# Patient Record
Sex: MALE | Race: WHITE | NOT HISPANIC OR LATINO | Employment: UNEMPLOYED | URBAN - METROPOLITAN AREA
[De-identification: names, ages, dates, MRNs, and addresses within clinical notes are randomized per-mention and may not be internally consistent; named-entity substitution may affect disease eponyms.]

---

## 2019-11-06 ENCOUNTER — ANCILLARY PROCEDURE (OUTPATIENT)
Dept: GENERAL RADIOLOGY | Facility: CLINIC | Age: 39
End: 2019-11-06
Attending: FAMILY MEDICINE
Payer: OTHER MISCELLANEOUS

## 2019-11-06 ENCOUNTER — TELEPHONE (OUTPATIENT)
Dept: FAMILY MEDICINE | Facility: CLINIC | Age: 39
End: 2019-11-06

## 2019-11-06 ENCOUNTER — OFFICE VISIT (OUTPATIENT)
Dept: FAMILY MEDICINE | Facility: CLINIC | Age: 39
End: 2019-11-06
Payer: OTHER MISCELLANEOUS

## 2019-11-06 VITALS
DIASTOLIC BLOOD PRESSURE: 89 MMHG | OXYGEN SATURATION: 98 % | SYSTOLIC BLOOD PRESSURE: 150 MMHG | TEMPERATURE: 98.1 F | HEIGHT: 72 IN | WEIGHT: 170 LBS | BODY MASS INDEX: 23.03 KG/M2 | HEART RATE: 114 BPM

## 2019-11-06 DIAGNOSIS — S22.42XG: ICD-10-CM
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
## 2019-11-06 DIAGNOSIS — M79.10 MYALGIA: ICD-10-CM

## 2019-11-06 DIAGNOSIS — R07.81 RIB PAIN ON LEFT SIDE: Primary | ICD-10-CM

## 2019-11-06 PROCEDURE — 71101 X-RAY EXAM UNILAT RIBS/CHEST: CPT | Mod: LT

## 2019-11-06 PROCEDURE — 99203 OFFICE O/P NEW LOW 30 MIN: CPT | Performed by: FAMILY MEDICINE

## 2019-11-06 RX ORDER — IBUPROFEN 600 MG/1
600 TABLET, FILM COATED ORAL
COMMUNITY
Start: 2019-10-16 | End: 2021-05-25

## 2019-11-06 RX ORDER — CYCLOBENZAPRINE HCL 5 MG
TABLET ORAL
Qty: 60 TABLET | Refills: 0 | Status: SHIPPED | OUTPATIENT
Start: 2019-11-06 | End: 2021-05-25

## 2019-11-06 RX ORDER — OXYCODONE AND ACETAMINOPHEN 5; 325 MG/1; MG/1
1-2 TABLET ORAL
COMMUNITY
Start: 2019-10-16 | End: 2021-05-25

## 2019-11-06 ASSESSMENT — MIFFLIN-ST. JEOR: SCORE: 1739.11

## 2019-11-06 ASSESSMENT — PAIN SCALES - GENERAL: PAINLEVEL: SEVERE PAIN (7)

## 2019-11-06 NOTE — LETTER
[facility]  [address] [facility] [address]  Phone: [phone]    November 6, 2019        Aleksandr Avina  [address] [facility] [address]          To whom it may concern:    RE: Aleksandr Avina    Patient was seen and treated today at our clinic.  Recommend going back to work with restrictions until re-evaluated.  Recommend re-evaluation in 1 week.    Restrictions: Sedentary work only.   No lifting no pushing no pulling no twisting of the trunk no kneeling no climbing    Please contact me for questions or concerns.      Sincerely,        Itzel Lenz MD

## 2019-11-06 NOTE — PATIENT INSTRUCTIONS
Recommend follow up in a week for re-evaluation  Recommend going to an Occupational Health Clinic or Workman's Comp Clinic   - [facility]       Patient Education     Rib Fracture (Broken Rib)     A chest x-ray may be done.   Your ribs are curved bones in your chest. They help protect your lungs and expand and contract when you breathe. Children's ribs bend easily and can often withstand a blow or fall. But adult ribs are more likely to break (fracture) under stress. Even coughing or a hard sneeze can fracture a rib.  When to go to the Emergency Room (ER)  Although they can be painful, most rib fractures aren't serious. But they often make it hard to cough or breathe deeply. Get medical care right away if you have:    Trouble breathing.    Nausea, vomiting, or stomach pain with a sore or bruised rib.    Pain that worsens over time.    An injury to the chest or stomach.  What to expect in the ER  Here is what will happen in the ER:     A healthcare provider will ask about your injury and examine you carefully.    An X-ray of your chest will likely be taken to show any major damage to ribs and lungs. But ribs can have small breaks that don't show up on X-rays, even though they still hurt.    You may be given medicine to ease your discomfort.    In rare cases, rib fractures can cause a lung to collapse or lead to bleeding in the chest. In these cases, a tube will be inserted into the chest to reinflate the lung or drain the blood.  Follow-up  You are likely to heal in 6 to 8 weeks. Most rib fractures heal on their own with no lasting effects. Call your healthcare provider right away if you notice any of these symptoms:    Increased chest pain    Shortness of breath    Fever or chills    Coughing up blood  Date Last Reviewed: 4/1/2018 2000-2018 [employer]. [address] [facility], [place] [address]. All rights reserved. This information is not intended as a substitute for professional medical  care. Always follow your healthcare professional's instructions.

## 2019-11-06 NOTE — PROGRESS NOTES
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   The  hit a boulder lost  and lost control  Patient fell forward and the  hit him from the back and patient was pinned to the swing set   Didn't come in right away but then eventually went in to the ER after a few days   head injury:No  loss of consciousness:  No  syncope or presyncope: No  chest pain or palpitation: No  mechanical fall:  Yes  using assistive devices:  No  blood thinners: No   denies headache  denies any nausea or vomiting  denies any amnesia, confusion or concussion symptoms  denies any blurring of vision  denies any otorrhea or rhinorhea  denies any neck pain  denies any back pain.  denies any chest pain or shortness of breath  denies any joint pain except noted above.  denies any bowel or bladder incontinence or motor or sensory deficits.  denies any abdominal pain, nausea or vomiting or flank pain  denies any hematuria    Patient was put on restrictions after the ER   Needed a follow up which is why he is here  No Known Allergies    History reviewed. No pertinent past medical history.    ibuprofen (ADVIL/MOTRIN) 600 MG tablet, Take 600 mg by mouth  oxyCODONE-acetaminophen (PERCOCET) 5-325 MG tablet, Take 1-2 tablets by mouth    No current facility-administered medications on file prior to visit.     Social history reviewed in Epic     ROS:   review of systems negative except for noted above.   No thoughts of harming self or others.     OBJECTIVE:  BP (!) 150/89   Pulse 114   Temp 98.1  F (36.7  C) (Oral)   Ht 1.829 m (6')   Wt 77.1 kg (170 lb)   SpO2 98%   BMI 23.06 kg/m     General:   awake, alert, and cooperative.  NAD.   Head: Normocephalic, atraumatic.  Eyes: Conjunctiva clear,   ENT: no periorbital ecchymosis, no otorrhea or rhinorrhea, negative Gastelum's sign, no raccoon eyes, no  hematympanum  Heart: Regular rate and rhythm. No murmur.  Lungs: Chest is clear; no wheezes or rales.   Abdomen: soft non-tender. No bruising noted.   Neuro: Alert and oriented - normal speech. Cranial nerves intact, MMT 5/5 all extremities, sensory intact, normal gait and normal cerebellar function  MS: Using extremities freely TENDER LEFT POSTERIOR RIBS , No cervical, thoracic, or lumbar spine tenderness  PSYCH:  Normal affect, normal speech  SKIN: no obvious rashes    ASSESSMENT:    ICD-10-CM    1. Rib pain on left side R07.81 XR Ribs & Chest Lt 3v     cyclobenzaprine (FLEXERIL) 5 MG tablet       PLAN:     Repeat chest xray done to follow up on rib fracture  [facility] records reviewed  CHEST XRAY report was pending at the time of discharge - later on showed 3 rib fractures instead of 1 initially seen on initial xray  Sometimes rib fractures do not show up immediately so this could be why  He does think pain is much better compared to initial injury   He denies any additional or recurrent fall in the interim  HE does think he is still in a lot of pain - although much better than initial  He also complains of some bilateral thigh achiness   See telephone note  Offered ER he declined - we will obtain additional CT chest abdomen and labs   Alarm signs or symptoms discussed, if present recommend go to ER   Pain control discussed - patient did not want any narcotic medication   Prescribed with flexeril  Warned sedating  Sedating medications given. Aware not to drive or operate machinery while on these medications. Caution with .   Do not take with alcohol or other sedating medications     Patient voiced undertanding.  Itzel Lenz M.D.       Itzel Lenz MD

## 2019-11-07 ENCOUNTER — NURSE TRIAGE (OUTPATIENT)
Dept: NURSING | Facility: CLINIC | Age: 39
End: 2019-11-07

## 2019-11-07 ENCOUNTER — TELEPHONE (OUTPATIENT)
Dept: FAMILY MEDICINE | Facility: CLINIC | Age: 39
End: 2019-11-07

## 2019-11-07 ENCOUNTER — ANCILLARY PROCEDURE (OUTPATIENT)
Dept: CT IMAGING | Facility: CLINIC | Age: 39
End: 2019-11-07
Attending: FAMILY MEDICINE
Payer: OTHER MISCELLANEOUS

## 2019-11-07 DIAGNOSIS — R07.81 RIB PAIN ON LEFT SIDE: ICD-10-CM

## 2019-11-07 DIAGNOSIS — M79.10 MYALGIA: ICD-10-CM

## 2019-11-07 DIAGNOSIS — S22.42XG: ICD-10-CM
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
## 2019-11-07 LAB
ALBUMIN SERPL-MCNC: 4.2 G/DL (ref 3.4–5)
ALP SERPL-CCNC: 87 U/L (ref 40–150)
ALT SERPL W P-5'-P-CCNC: 149 U/L (ref 0–70)
ANION GAP SERPL CALCULATED.3IONS-SCNC: 12 MMOL/L (ref 3–14)
AST SERPL W P-5'-P-CCNC: 127 U/L (ref 0–45)
BILIRUB SERPL-MCNC: 0.4 MG/DL (ref 0.2–1.3)
BUN SERPL-MCNC: 8 MG/DL (ref 7–30)
CALCIUM SERPL-MCNC: 8.6 MG/DL (ref 8.5–10.1)
CHLORIDE SERPL-SCNC: 95 MMOL/L (ref 94–109)
CK SERPL-CCNC: 163 U/L (ref 30–300)
CO2 SERPL-SCNC: 24 MMOL/L (ref 20–32)
CREAT SERPL-MCNC: 0.66 MG/DL (ref 0.66–1.25)
D DIMER PPP FEU-MCNC: 0.8 UG/ML FEU (ref 0–0.5)
ERYTHROCYTE [DISTWIDTH] IN BLOOD BY AUTOMATED COUNT: 12.3 % (ref 10–15)
GFR SERPL CREATININE-BSD FRML MDRD: >90 ML/MIN/{1.73_M2}
GLUCOSE SERPL-MCNC: 105 MG/DL (ref 70–99)
HCT VFR BLD AUTO: 44.9 % (ref 40–53)
HGB BLD-MCNC: 15.7 G/DL (ref 13.3–17.7)
MCH RBC QN AUTO: 35.1 PG (ref 26.5–33)
MCHC RBC AUTO-ENTMCNC: 35 G/DL (ref 31.5–36.5)
MCV RBC AUTO: 100 FL (ref 78–100)
PLATELET # BLD AUTO: 124 10E9/L (ref 150–450)
POTASSIUM SERPL-SCNC: 4.2 MMOL/L (ref 3.4–5.3)
PROT SERPL-MCNC: 7.7 G/DL (ref 6.8–8.8)
RBC # BLD AUTO: 4.47 10E12/L (ref 4.4–5.9)
SODIUM SERPL-SCNC: 131 MMOL/L (ref 133–144)
WBC # BLD AUTO: 6.1 10E9/L (ref 4–11)

## 2019-11-07 PROCEDURE — 71260 CT THORAX DX C+: CPT | Mod: TC

## 2019-11-07 PROCEDURE — 85379 FIBRIN DEGRADATION QUANT: CPT | Performed by: FAMILY MEDICINE

## 2019-11-07 PROCEDURE — 74160 CT ABDOMEN W/CONTRAST: CPT | Mod: TC

## 2019-11-07 PROCEDURE — 80053 COMPREHEN METABOLIC PANEL: CPT | Performed by: FAMILY MEDICINE

## 2019-11-07 PROCEDURE — 85027 COMPLETE CBC AUTOMATED: CPT | Performed by: FAMILY MEDICINE

## 2019-11-07 PROCEDURE — 82550 ASSAY OF CK (CPK): CPT | Performed by: FAMILY MEDICINE

## 2019-11-07 PROCEDURE — 36415 COLL VENOUS BLD VENIPUNCTURE: CPT | Performed by: FAMILY MEDICINE

## 2019-11-07 RX ORDER — IOPAMIDOL 755 MG/ML
83 INJECTION, SOLUTION INTRAVASCULAR ONCE
Status: COMPLETED | OUTPATIENT
Start: 2019-11-07 | End: 2019-11-07

## 2019-11-07 RX ADMIN — IOPAMIDOL 83 ML: 755 INJECTION, SOLUTION INTRAVASCULAR at 11:42

## 2019-11-07 NOTE — TELEPHONE ENCOUNTER
I called patient this evening with xray report  Given 3 fractures - concern of a more serious injury  Recommend CT chest and abdomen to further evaluate for any other traumatic injury  Also recommend labs    To TC - please call and schedule the followin.  CT chest and abdomen STAT read only recommend today  2. please have patient schedule a lab appointment to do today as well.    Thank you    Itzel Lenz M.D.

## 2019-11-07 NOTE — TELEPHONE ENCOUNTER
Called and set up CT at [facility]-arrival @ 11:15 AM today. Lab appointment also @ [facility] at 10:45 AM. Called and informed patient of this and nothing to eat or drink 2 hours before the CT.Brenda Sauer MA/JUMANA

## 2019-11-08 NOTE — TELEPHONE ENCOUNTER
Clinic Action Needed:No    Reason for Call: Aleksandr said he just received a call from Dr. Lenz telling him he has 6 broken ribs and that he should be further assessed in ER tonight.  Aleksandr was very skeptical of that report.    Advised Aleksandr that if he was instructed to go to ER for further evaluation of rib injuries I urge him to follow the advice of the provider.    Caller appears to understand directives and agrees with plan.     Routed to: Not routed.    Shiela Stubbs RN  [facility]

## 2019-11-08 NOTE — TELEPHONE ENCOUNTER
Patient was called with CT chest abdomen (trauma protocol) results and lab results  Discussed 6 rib fractures on CT but no other internal injuries  Discussed lab results - low sodium, elevated liver enzymes -   Patient has a history of alcoholism and admits to be drinking daily but he says not since this morning. Risks discussed. Risks of withdrawal seizures also discuussed. No thoughts of harming self or others. He feels safe. He states he is willing to start some counseling about this - recommend discussion with primary care provider on follow up  He will need to establish care and follow up with a primary care provider sometime next week.    However d-dimer was elevated - ordered because of bilateral thigh pain - no injury.  Increased risk for DVT - recommend ER for doppler US bilateral rule out DVT. Patient voiced understanding. Declined ambulance. Risks of transfer discussed.    Itzel Lenz M.D.

## 2019-11-09 ENCOUNTER — NURSE TRIAGE (OUTPATIENT)
Dept: NURSING | Facility: CLINIC | Age: 39
End: 2019-11-09

## 2019-11-09 NOTE — TELEPHONE ENCOUNTER
"Jason called asking about two prescriptions he thought were going to be prescribed for his pain from the broken ribs.  I read to him a portion of Dr Robbins's note:    \"Pain control discussed - patient did not want any narcotic medication   Prescribed with flexeril  Warned sedating  Sedating medications given. Aware not to drive or operate machinery while on these medications. Caution with .   Do not take with alcohol or other sedating medications\".    He stated then that he did remember that conversation.  I encouraged him to take the ibuprofen and always with food in his stomach.  I told him to take two tablets every six hours and that it will after a few days help with his pain because of its antinflammatory properties.    He stated understanding and agreement.    He said he is having trouble breathing though it's no worse than what it's been all along. I told him if his breathing worsens he needs to consider being seen again.  Again, he stated understanding and agreement of this.   Alma Rosa BRITO RN [facility]     "

## 2020-03-11 ENCOUNTER — TELEPHONE (OUTPATIENT)
Dept: FAMILY MEDICINE | Facility: CLINIC | Age: 40
End: 2020-03-11

## 2020-03-11 NOTE — TELEPHONE ENCOUNTER
I received note requesting a return to work.  In my outbox.  This was 4 months ago. Paperwork just received and in my mail today   Please scan and document into Epic.   Then give patient the letter.  Unfortunately I only have limited information there because he needed a follow up appointment  HE needs to contact the person he followed up with to sign additional paperwork if necessary.  Otherwise schedule an appointment with an occupational health clinic or primary care provider.  I do NOT advise an urgent care appointment for follow up  Thanks  Itzel Lenz M.D.

## 2020-03-18 NOTE — TELEPHONE ENCOUNTER
On 3/13/20 I faxed the completed and signed [facility] paperwork to Yasmine @[phone].  I mailed the return to work letter to the patient at his home address on file on [address] in [place].  Victorina Hurd,

## 2021-05-24 NOTE — PROGRESS NOTES
Assessment & Plan     Rash  40-year-old male with left neck and face coalescing macular rash, by history starts is vesicular, now with crusting.  Started after wrestling with a friend.  States friend does not have any other similar rashes, we discussed herpes gladiatorum.  Seems to be most pronounced in areas of hair, possibly folliculitis, will treat as such with cephalexin.  We discussed other differential diagnosis, to include contact dermatitis, he does not have allergies, nor does he remember coming in contact with anything.  He will follow-up if no improvement or any worsening in the next 48 hours.  Discussed antibiotic use, complete for full duration.  - cephALEXin (KEFLEX) 500 MG capsule; Take 1 capsule (500 mg) by mouth 2 times daily for 10 days    Tobacco Cessation:   reports that he has been smoking. He has been smoking about 0.50 packs per day. He has never used smokeless tobacco.      Return in about 4 weeks (around 6/22/2021) for Annual Well Check.    Donato Nieto MD  [facility] IN[facility]ID Brandon is a 40 year old who presents for the following health issues     HPI     Rash  Onset/Duration: 2 weeks  Description  Location: neck, chest , face  Character: blotchy, raised, draining, red  Itching: moderate  Intensity:  moderate  Progression of Symptoms:  worsening  Accompanying signs and symptoms:   Fever: no  Body aches or joint pain: no  Sore throat symptoms: no  Recent cold symptoms: no  History:           Previous episodes of similar rash: None  New exposures:  None  Recent travel: no  Exposure to similar rash: no  Precipitating or alleviating factors: none  Therapies tried and outcome: A&D ointment and rubbing alchohol, benadryl        Review of Systems   Constitutional, HEENT, cardiovascular, pulmonary, gi and gu systems are negative, except as otherwise noted.      Objective    /80 (BP Location: Left arm, Patient Position: Chair, Cuff Size: Adult Regular)    "Pulse 100   Temp 97.2  F (36.2  C) (Temporal)   Resp 17   Ht 1.765 m (5' 9.5\")   Wt 64.4 kg (142 lb)   SpO2 96%   BMI 20.67 kg/m    Body mass index is 20.67 kg/m .  Physical Exam   GENERAL: healthy, alert and no distress  NECK: no adenopathy, no asymmetry, masses, or scars and thyroid normal to palpation  RESP: lungs clear to auscultation - no rales, rhonchi or wheezes  CV: regular rate and rhythm, normal S1 S2, no S3 or S4, no murmur, click or rub, no peripheral edema and peripheral pulses strong  MS: no gross musculoskeletal defects noted, no edema  SKIN: Coalescing macular rash left lateral neck with some mild crusting, smaller macular rashes in beard and lateral upper arm.  NEURO: Normal strength and tone, mentation intact and speech normal  PSYCH: mentation appears normal, affect normal/bright            "

## 2021-05-25 ENCOUNTER — OFFICE VISIT (OUTPATIENT)
Dept: FAMILY MEDICINE | Facility: CLINIC | Age: 41
End: 2021-05-25
Payer: COMMERCIAL

## 2021-05-25 ENCOUNTER — TELEPHONE (OUTPATIENT)
Dept: FAMILY MEDICINE | Facility: CLINIC | Age: 41
End: 2021-05-25

## 2021-05-25 VITALS
BODY MASS INDEX: 20.33 KG/M2 | TEMPERATURE: 97.2 F | DIASTOLIC BLOOD PRESSURE: 80 MMHG | HEART RATE: 100 BPM | WEIGHT: 142 LBS | HEIGHT: 70 IN | SYSTOLIC BLOOD PRESSURE: 130 MMHG | RESPIRATION RATE: 17 BRPM | OXYGEN SATURATION: 96 %

## 2021-05-25 DIAGNOSIS — R21 RASH: Primary | ICD-10-CM

## 2021-05-25 PROCEDURE — 99214 OFFICE O/P EST MOD 30 MIN: CPT | Performed by: FAMILY MEDICINE

## 2021-05-25 RX ORDER — CEPHALEXIN 500 MG/1
500 CAPSULE ORAL 2 TIMES DAILY
Qty: 20 CAPSULE | Refills: 0 | Status: SHIPPED | OUTPATIENT
Start: 2021-05-25 | End: 2021-06-04

## 2021-05-25 ASSESSMENT — MIFFLIN-ST. JEOR: SCORE: 1562.42

## 2021-05-25 ASSESSMENT — PAIN SCALES - GENERAL: PAINLEVEL: MODERATE PAIN (5)

## 2021-05-25 NOTE — PATIENT INSTRUCTIONS
"  Patient Education     Contact Dermatitis  Contact dermatitis is a skin rash caused by something that touches the skin and makes it irritated and inflamed. Your skin may be red, swollen, dry, and may be cracked. Blisters may form and ooze. The rash will itch.   Contact dermatitis often forms on the face and neck, backs of hands, forearms, genitals, and lower legs. But it can affect any area.   People can get contact dermatitis from lots of sources. These include:    Plants such as poison ivy, oak, or sumac    Chemicals in hair dyes and rinses, soaps, solvents, waxes, fingernail polish, and deodorants     Jewelry or watchbands made of nickel or cobalt  Contact dermatitis is not passed from person to person.  Talk with your healthcare provider about what may have caused the rash. A type of allergy testing called \"patch testing\" may be used to discover what you are allergic to. You will need to stay away from the source of the rash in the future to prevent it from coming back.   Treatment is done to ease itching and prevent the rash from coming back. The rash should go away in a few days to a few weeks.   Home care  Your healthcare provider may prescribe medicine to ease swelling and itching. Follow all instructions when using these medicines.   General care    Stay away from anything that heats up your skin, such as hot showers or baths, or direct sunlight. This can make itching worse.    Apply cold compresses to soothe your sores to help ease your symptoms. Do this for 30 minutes 3 to 4 times a day. You can make a cold compress by soaking a cloth in cold water. Squeeze out excess water. You can add colloidal oatmeal to the water to help reduce itching. For severe itching in a small area, apply an ice pack wrapped in a thin towel. Do this for 20 minutes 3 to 4 times a day.    You can also try wet dressings. One way to do this is to wear a wet piece of clothing under a dry one. Wear a damp shirt under a dry shirt if " your upper body is affected. This can relieve itching and prevent you from scratching the affected area.    You can also help ease large areas of itching by taking a lukewarm bath with colloidal oatmeal added to the water.    Use hydrocortisone cream for redness and irritation, unless another medicine was prescribed. Calamine lotion can also relieve mild symptoms.    Use oral diphenhydramine to help reduce itching. You can buy this antihistamine at drugstores and grocery stores. It can make you sleepy, so use lower doses during the daytime. Don't use diphenhydramine if you have glaucoma or have trouble urinating because of an enlarged prostate.    If a plant causes your rash, make sure to wash your skin and the clothes you were wearing when you came into contact with the plant. This is to wash away the plant oils that gave you the rash and prevent more or worse symptoms. If you have a pet that's been outdoors, its fur may also have oil from the plant. Bathe your pet with soap or shampoo.    Stay away from the substance or object that causes your symptoms. If you can t stay away from it, wear gloves or some other type of protection    Follow-up care  Follow up with your healthcare provider, or as advised.  When to seek medical advice  Call your healthcare provider or seek medical attention right away if any of these occur:     Spreading of the rash to other parts of your body    Severe swelling of your face, eyelids, mouth, throat or tongue    Trouble urinating due to swelling in the genital area    Fever of 100.4 F (38 C) or higher, or as advised by your provider    Redness or swelling that gets worse    Pain that gets worse    Foul-smelling fluid leaking from the skin    Yellow-brown crusts on the open blisters  [employer] last reviewed this educational content on 8/1/2019 2000-2021 [employer]. All rights reserved. This information is not intended as a substitute for professional medical care.  Always follow your healthcare professional's instructions.

## 2021-05-25 NOTE — TELEPHONE ENCOUNTER
Patient is calling to report he had an appointment with BRAD Nieto today, 5/25/21, for contact dermatitis.  He would like to know if it is OK to continue to use rubbing alcohol and A&D to the area.    Patient is informed to pull out his paperwork and RN went through home care measures with him.  He is instructed to just use soap and water to wash the area and not rubbing alcohol.  He is also instructed to get probiotics for his GI tract as abx can make his bowels runny.      Patient understands and agrees to this plan.  He will call back with further questions or concerns.  Closing this encounter.  Emily Connelly, LACEYN, RN

## 2021-05-28 ENCOUNTER — TELEPHONE (OUTPATIENT)
Dept: FAMILY MEDICINE | Facility: CLINIC | Age: 41
End: 2021-05-28

## 2021-05-28 DIAGNOSIS — R21 RASH: Primary | ICD-10-CM

## 2021-05-28 RX ORDER — PREDNISONE 20 MG/1
TABLET ORAL
Qty: 20 TABLET | Refills: 0 | Status: SHIPPED | OUTPATIENT
Start: 2021-05-28 | End: 2021-08-04

## 2021-05-28 NOTE — TELEPHONE ENCOUNTER
Advise patient, given no improvement, rash is likely contact related.  I have ordered a tapered regimen of prednisone.  Please start that and follow-up no improvement or any worsening.    Donato Nieto MD, FAAFP  Family Medicine Physician  [facility] [place]  [address], [place] [address]

## 2021-05-28 NOTE — TELEPHONE ENCOUNTER
Spoke with patient.  Rash is on Forehead to cheek to neck.  right under eyelid all LEFT only.  Possible shingles???  Advised contact precautions. Wash and keep clean and dry.  States that is definitely spreading but only on the left side.  Patient states that Doc said it could be herpes.  advised patient my question would be based on only one side of face possible and shingles is in the herpes family.  Discuss shingles for common knowledge and advised to follow up if still not improving.  Patient is scheduled for Monday just in case. Will try an OTC calamine or other anti-itch cream as needed.  Route to SA as FYI for Tuesday visit.    Vin Brooks, LACEYN, RN, PHN  [facility] ~ Registered Nurse  Clinic Triage ~ [facility]  May 28, 2021

## 2021-06-10 ENCOUNTER — TELEPHONE (OUTPATIENT)
Dept: FAMILY MEDICINE | Facility: CLINIC | Age: 41
End: 2021-06-10

## 2021-06-10 NOTE — TELEPHONE ENCOUNTER
Spoke with patient. Wanted to talk a little bit more about a vasectomy. Has consult scheduled for 06/21/2021.     LACEY CoradoN, RN, PHN  [facility] ~ Registered Nurse  Clinic Triage ~ [place] [facility]  Soo 10, 2021

## 2021-08-02 ENCOUNTER — TELEPHONE (OUTPATIENT)
Dept: FAMILY MEDICINE | Facility: CLINIC | Age: 41
End: 2021-08-02

## 2021-08-02 NOTE — TELEPHONE ENCOUNTER
"Patient calling stating he has \"massive sores underneath left eye.\" Noticed about one week ago. Scabbed over now and oozing yellowish in color. Painful at 5/10. Being that it's close to his actual \"eyeball\" he is going to rate this 12/10. Entire eye is swollen. No known injury. Suppressed vision in eye. States he wakes up and the eye is getting worse and worse. Eye is irritated. Discharge on right side of tear duct. Crusts up.     States he also has sore on ring finger and it will not heal. About one week or longer. Redness. Swelling. No drainage. Still able to use his hand. States it's right on the knuckle.     States he has had this in the past and thought it may be shingles.     Advised the emergency room due to having suppressed vision in that eye and swelling. Patient is worried about ER cost and is declining.     Had a visit scheduled with SA on 8/12. Advised patient be seen sooner and rescheduled patient with DFA on 8/4 at 11 am. Patient agrees to this time and day.     Advised if anything worsens or starts a fever then he needs to be evaluated in ER. Patient states understanding.     CHAKA [facility]    "

## 2021-08-03 NOTE — PROGRESS NOTES
Assessment & Plan   1. Cellulitis of finger of right hand: Given some slight erythema concern for superficial cellulitis.  Low suspicion for septic arthritis given range of motion and less pain that I would expect; however, given wound overlies this joint will obtain blood cultures.  Start Bactrim twice daily for 7 days.  Follow-up if not improving.  - XR Finger Right G/E 2 Views; Future  - Blood Culture Peripheral Blood; Future  - Blood Culture Peripheral Blood; Future  - CBC with platelets; Future  - Adult Dermatology Referral; Future  - Basic metabolic panel  (Ca, Cl, CO2, Creat, Gluc, K, Na, BUN); Future  - sulfamethoxazole-trimethoprim (BACTRIM DS) 800-160 MG tablet; Take 1 tablet by mouth 2 times daily for 7 days  Dispense: 14 tablet; Refill: 0    2. Skin lesion: Unclear cause of lesion on the left face.  Recommend referral to dermatology for consideration of biopsy and other treatment options.  Patient agreeable plan.  - Adult Dermatology Referral; Future    3. Screening for hyperlipidemia: Patient okay getting screening lipid test for preventative care done today.  - Lipid panel reflex to direct LDL Fasting      Return in about 1 week (around 8/11/2021), or if symptoms worsen or fail to improve.    Madan Diaz MD  [facility]    This chart is completed utilizing dictation software; typos and/or incorrect word substitutions may unintentionally occur.      Joaquim Brandon is a 40 year old who presents for the following health issues  accompanied by his spouse:    HPI     Skin Lesion  Onset/Duration: 1 week  Description  Location: face  Color: brown  Border description: raised, scaly  Character: raised  Itching: no  Bleeding:  Draining and crusting over  Intensity:  8/10  Progression of Symptoms:  worsening  Accompanying signs and symptoms:   Bleeding: no  Scaling: YES  Excessive sun exposure/tanning: no  Sunscreen used: no  History:           Any previous history of  "skin cancer: no  Any family history of melanoma: no  Previous episodes of similar lesion: YES  Precipitating or alleviating factors: bactin  Therapies tried and outcome: none    Patient reports 1 week of painful scaly rash on left face just lateral to the eye.  Denies any significant drainage.  Previously treated with an antibiotic by partner Dr. Donato Nieto.  This appears to be back in May.  Wants to make sure it was not shingles.  Denies any vision changes.  No other vesicular lesions.    Additionally he was wrestling with his son 2 to 3 weeks ago.  Scraped his right PIP ring finger joint on concrete.  Has not been healing well and is now having increasing pain and swelling around the area.  Denies any fever, chills, pus drainage.  Worse when clenching his fist.    Review of Systems   Constitutional, HEENT, lymph, derm, msk, cardiovascular, pulmonary, gi and gu systems are negative, except as otherwise noted.      Objective    /84 (BP Location: Left arm, Patient Position: Chair, Cuff Size: Adult Regular)   Pulse 103   Temp 98  F (36.7  C) (Temporal)   Resp 17   Ht 1.765 m (5' 9.5\")   Wt 64.4 kg (142 lb)   SpO2 96%   BMI 20.67 kg/m    Body mass index is 20.67 kg/m .  Physical Exam   General: Appears well and in no acute distress.  Cardiovascular: Regular rate and rhythm, normal S1 and S2 without murmur. No extra heartsounds or friction rub. Radial pulses present and equal bilaterally.  Respiratory: Lungs clear to auscultation bilaterally. No wheezing or crackles. No prolonged expiration. Symmetrical chest rise.  Musculoskeletal: Pain of right ring finger PIP joint when making a fist. Slight localized swelling and tenderness to palpation of the joint as well. No gross extremity deformities. No peripheral edema. Normal muscle bulk.  Derm: Slight erythema extending proximally on the dorsal hand /wrist. Scaly lesion shown below over left eye. No suspicious lesions or rashes over exposed " surfaces.    Labs: pending                FINGER RIGHT TWO VIEW   8/4/2021 11:37 AM      HISTORY: Swelling, overlying wound on PIP joint fourth finger. Non  healing for 2-3 weeks; Cellulitis of finger of right hand.     COMPARISON: None.                                                                      IMPRESSION: Normal joint spacing and alignment. No fracture. No lytic  or destructive lesion.

## 2021-08-04 ENCOUNTER — ANCILLARY PROCEDURE (OUTPATIENT)
Dept: GENERAL RADIOLOGY | Facility: CLINIC | Age: 41
End: 2021-08-04
Attending: FAMILY MEDICINE
Payer: COMMERCIAL

## 2021-08-04 ENCOUNTER — OFFICE VISIT (OUTPATIENT)
Dept: FAMILY MEDICINE | Facility: CLINIC | Age: 41
End: 2021-08-04
Payer: COMMERCIAL

## 2021-08-04 VITALS
RESPIRATION RATE: 17 BRPM | SYSTOLIC BLOOD PRESSURE: 128 MMHG | WEIGHT: 142 LBS | TEMPERATURE: 98 F | OXYGEN SATURATION: 96 % | HEIGHT: 70 IN | HEART RATE: 103 BPM | BODY MASS INDEX: 20.33 KG/M2 | DIASTOLIC BLOOD PRESSURE: 84 MMHG

## 2021-08-04 DIAGNOSIS — L03.011 CELLULITIS OF FINGER OF RIGHT HAND: ICD-10-CM

## 2021-08-04 DIAGNOSIS — L98.9 SKIN LESION: ICD-10-CM

## 2021-08-04 DIAGNOSIS — L03.011 CELLULITIS OF FINGER OF RIGHT HAND: Primary | ICD-10-CM

## 2021-08-04 DIAGNOSIS — Z13.220 SCREENING FOR HYPERLIPIDEMIA: ICD-10-CM

## 2021-08-04 LAB
ANION GAP SERPL CALCULATED.3IONS-SCNC: 9 MMOL/L (ref 3–14)
BUN SERPL-MCNC: 3 MG/DL (ref 7–30)
CALCIUM SERPL-MCNC: 8.1 MG/DL (ref 8.5–10.1)
CHLORIDE BLD-SCNC: 105 MMOL/L (ref 94–109)
CHOLEST SERPL-MCNC: 242 MG/DL
CO2 SERPL-SCNC: 26 MMOL/L (ref 20–32)
CREAT SERPL-MCNC: 0.63 MG/DL (ref 0.66–1.25)
ERYTHROCYTE [DISTWIDTH] IN BLOOD BY AUTOMATED COUNT: 11.9 % (ref 10–15)
FASTING STATUS PATIENT QL REPORTED: YES
GFR SERPL CREATININE-BSD FRML MDRD: >90 ML/MIN/1.73M2
GLUCOSE BLD-MCNC: 80 MG/DL (ref 70–99)
HCT VFR BLD AUTO: 42.4 % (ref 40–53)
HDLC SERPL-MCNC: 181 MG/DL
HGB BLD-MCNC: 14.7 G/DL (ref 13.3–17.7)
LDLC SERPL CALC-MCNC: 43 MG/DL
MCH RBC QN AUTO: 36.2 PG (ref 26.5–33)
MCHC RBC AUTO-ENTMCNC: 34.7 G/DL (ref 31.5–36.5)
MCV RBC AUTO: 104 FL (ref 78–100)
NONHDLC SERPL-MCNC: 61 MG/DL
PLATELET # BLD AUTO: 73 10E3/UL (ref 150–450)
POTASSIUM BLD-SCNC: 3.7 MMOL/L (ref 3.4–5.3)
RBC # BLD AUTO: 4.06 10E6/UL (ref 4.4–5.9)
SODIUM SERPL-SCNC: 140 MMOL/L (ref 133–144)
TRIGL SERPL-MCNC: 90 MG/DL
WBC # BLD AUTO: 8.2 10E3/UL (ref 4–11)

## 2021-08-04 PROCEDURE — 36415 COLL VENOUS BLD VENIPUNCTURE: CPT | Performed by: FAMILY MEDICINE

## 2021-08-04 PROCEDURE — 87040 BLOOD CULTURE FOR BACTERIA: CPT | Performed by: FAMILY MEDICINE

## 2021-08-04 PROCEDURE — 80061 LIPID PANEL: CPT | Performed by: FAMILY MEDICINE

## 2021-08-04 PROCEDURE — 73140 X-RAY EXAM OF FINGER(S): CPT | Mod: RT | Performed by: RADIOLOGY

## 2021-08-04 PROCEDURE — 80048 BASIC METABOLIC PNL TOTAL CA: CPT | Performed by: FAMILY MEDICINE

## 2021-08-04 PROCEDURE — 99214 OFFICE O/P EST MOD 30 MIN: CPT | Performed by: FAMILY MEDICINE

## 2021-08-04 PROCEDURE — 85027 COMPLETE CBC AUTOMATED: CPT | Performed by: FAMILY MEDICINE

## 2021-08-04 RX ORDER — SULFAMETHOXAZOLE/TRIMETHOPRIM 800-160 MG
1 TABLET ORAL 2 TIMES DAILY
Qty: 14 TABLET | Refills: 0 | Status: SHIPPED | OUTPATIENT
Start: 2021-08-04 | End: 2021-08-11

## 2021-08-04 ASSESSMENT — PAIN SCALES - GENERAL: PAINLEVEL: EXTREME PAIN (8)

## 2021-08-04 ASSESSMENT — MIFFLIN-ST. JEOR: SCORE: 1562.42

## 2021-08-04 NOTE — RESULT ENCOUNTER NOTE
Please inform of results if patient has not viewed in [facility].    Your xray results came back normal. Continue with the plan to see dermatology and the antibiotic.    Please call the clinic with any questions you may have.     Have a great day,    Dr. Max

## 2021-08-04 NOTE — PATIENT INSTRUCTIONS
Important Takeaway Points From This Visit:    I will call with your lab and xray results.    I sent an antibiotic to your pharmacy called bactrim. Take this twice daily for 1 week.    I have given a dermatology referral. They will call to schedule this.      As always, please call with any questions or concerns. I look forward to seeing you again soon!    Take care,  Dr. Diaz    Your current medication list is printed. Please keep this with you - it is helpful to bring this current list to any other medical appointments. It can also be helpful if you ever go to the emergency room or hospital.    If you had lab testing today we will call you with the results. The phone number we will call with your results is # [phone] (home) . If this is not the best number please call our clinic and change the number.    If you need any refills, please call your pharmacy and they will contact us.    If you have any further concerns or wish to schedule another appointment, please call our office at [phone].    If you have a medical emergency, please call [phone].    Thank you for coming to [facility]!

## 2021-08-05 DIAGNOSIS — D75.89 MACROCYTOSIS: Primary | ICD-10-CM

## 2021-08-05 DIAGNOSIS — D69.6 THROMBOCYTOPENIA (H): ICD-10-CM

## 2021-08-05 NOTE — RESULT ENCOUNTER NOTE
Please inform of results if patient has not viewed in [facility].    Your total cholesterol was high.This can be improved with diet and exercise. All animal based foods such as meat, dairy, and eggs contain cholesterol. All plant based foods such as fruits, nuts, and vegetables do not.    Your kidney function and electrolytes were normal.     You cell counts show large red blood cells and low platelets. This is often from alcohol use in great excess. Your white count was normal making a significant joint infection unlikely. I would like you to see a hematologist to work up and make sure your cell count abnormalities are from alcohol use and I strongly discourage further use if you are drinking.    Please let me know if you have any questions.    Thank you,    Dr. Max          lab results came back normal.    Please call the clinic with any questions you may have.     Have a great day,    Dr. Max

## 2021-08-09 LAB
BACTERIA BLD CULT: NO GROWTH
BACTERIA BLD CULT: NO GROWTH

## 2021-08-10 NOTE — RESULT ENCOUNTER NOTE
Please inform of results if patient has not viewed in [facility].    Your blood culture lab results came back normal without any bacterial growth. Finish the antibiotic and follow-up if your symptoms have not resolved.     Please call the clinic with any questions you may have.     Have a great day,    Dr. Max

## 2021-08-11 ENCOUNTER — TELEPHONE (OUTPATIENT)
Dept: FAMILY MEDICINE | Facility: CLINIC | Age: 41
End: 2021-08-11

## 2021-08-11 NOTE — TELEPHONE ENCOUNTER
Pt was informed of lab results per Dr. Max's note below. Pt states he is fine and stopped taking his antibiotics because they made his stomach upset and he does not like taking medication.       ----- Message from Madan Diaz MD sent at 8/10/2021 12:36 PM CDT -----  Please inform of results if patient has not viewed in [facility].    Your blood culture lab results came back normal without any bacterial growth. Finish the antibiotic and follow-up if your symptoms have not resolved.     Please call the clinic with any questions you may have.     Have a great day,    Dr. Max

## 2021-08-11 NOTE — PROGRESS NOTES
RECORDS STATUS - ALL OTHER DIAGNOSIS      RECORDS RECEIVED FROM: [facility]   DATE RECEIVED: 11/10/2021   NOTES STATUS DETAILS   OFFICE NOTE from referring provider Complete Madan Diaz MD   OFFICE NOTE from medical oncologist N/A    DISCHARGE SUMMARY from hospital N/A    DISCHARGE REPORT from the ER     OPERATIVE REPORT N/A    MEDICATION LIST Complete [facility]   CLINICAL TRIAL TREATMENTS TO DATE     LABS     PATHOLOGY REPORTS N/A    ANYTHING RELATED TO DIAGNOSIS Complete Labs last updated on 8/4/2021   GENONOMIC TESTING     TYPE:     IMAGING (NEED IMAGES & REPORT)     CT SCANS     MRI     MAMMO     ULTRASOUND     PET

## 2021-10-13 ENCOUNTER — NURSE TRIAGE (OUTPATIENT)
Dept: NURSING | Facility: CLINIC | Age: 41
End: 2021-10-13

## 2021-10-13 NOTE — TELEPHONE ENCOUNTER
Patient reports he was seen 2 months ago for a derm problem, and he was started on Bactrim, and now he states the infection, or cellulitis is now back again, and is worse.  He was calling to make an appointment for Monday the , but advised that he should be seen sooner , because of his symptoms.  Patient agreed to go to  Clinic in [place], and states he will go there today.    The location is his face, and he states this infection is now traveling up into his nares, and sometimes it bleeds.    Leeanna House, RN RN  [facility] Triage/refill nurse        Reason for Disposition    [1] Taking antibiotic < 24 hours AND [2] cellulitis symptoms are WORSE (e.g., spreading redness, pain, swelling, drainage) AND [3] no fever    Additional Information    Negative:  surgical wound infection suspected (post-op)    Negative: Surgical wound infection suspected (post-op)    Negative: [1] Widespread rash AND [2] drug rash suspected (i.e., allergic reaction to antibiotic)    Negative: Animal bite wound infection suspected    Negative: Shock suspected (e.g., cold/pale/clammy skin, too weak to stand, low BP, rapid pulse)    Negative: Sounds like a life-threatening emergency to the triager    Negative: SEVERE pain    Negative: [1] SEVERE pain with bending of finger (or toe) AND [2] cellulitis on hand (or foot)    Negative: Fever > 104 F (40 C)    Negative: [1] Widespread rash AND [2] bright red, sunburn-like    Negative: Black (necrotic), dark purple, or blisters develop in area of cellulitis    Negative: Patient sounds very sick or weak to the triager    Negative: [1] Taking antibiotic > 24 hours AND [2] fever > 100.4 F (38.0 C)    Negative: [1] Taking antibiotic > 24 hours AND [2] red streak (or line) runs from area of infection    Negative: [1] Taking antibiotic > 24 hours AND [2] cellulitis symptoms are WORSE (e.g., spreading redness, pain, swelling)    Negative: [1] Finished taking antibiotic AND [2]  cellulitis symptoms are WORSE (e.g., redness, pain  drainage, swelling)    Negative: [1] Red streak (or line) runs from area of infection AND [2] longer than 4 inches (10 cm)    Negative: [1] Fever > 100.0 F (37.8 C) AND [2] new onset    Negative: [1] Red streak runs from area of infection AND [2] new onset    Negative: [1] Caller has URGENT question AND [2] triager unable to answer question    Protocols used: CELLULITIS ON ANTIBIOTIC FOLLOW-UP CALL-A-AH

## 2021-10-14 ENCOUNTER — OFFICE VISIT (OUTPATIENT)
Dept: URGENT CARE | Facility: URGENT CARE | Age: 41
End: 2021-10-14
Payer: COMMERCIAL

## 2021-10-14 VITALS
OXYGEN SATURATION: 100 % | SYSTOLIC BLOOD PRESSURE: 131 MMHG | TEMPERATURE: 97.5 F | HEART RATE: 117 BPM | DIASTOLIC BLOOD PRESSURE: 81 MMHG

## 2021-10-14 DIAGNOSIS — L01.00 IMPETIGO: Primary | ICD-10-CM

## 2021-10-14 PROCEDURE — 99213 OFFICE O/P EST LOW 20 MIN: CPT | Performed by: PHYSICIAN ASSISTANT

## 2021-10-14 RX ORDER — MUPIROCIN 20 MG/G
OINTMENT TOPICAL 3 TIMES DAILY
Qty: 30 G | Refills: 0 | Status: SHIPPED | OUTPATIENT
Start: 2021-10-14

## 2021-10-14 ASSESSMENT — ENCOUNTER SYMPTOMS
GASTROINTESTINAL NEGATIVE: 1
MYALGIAS: 0
CHILLS: 0
DIARRHEA: 0
COUGH: 0
PALPITATIONS: 0
HEADACHES: 0
WHEEZING: 0
NEUROLOGICAL NEGATIVE: 1
MUSCULOSKELETAL NEGATIVE: 1
ALLERGIC/IMMUNOLOGIC NEGATIVE: 1
VOMITING: 0
CHEST TIGHTNESS: 0
FEVER: 0
CARDIOVASCULAR NEGATIVE: 1
CONSTITUTIONAL NEGATIVE: 1
ABDOMINAL PAIN: 0
SHORTNESS OF BREATH: 0
SORE THROAT: 0
NAUSEA: 0
RESPIRATORY NEGATIVE: 1

## 2021-10-14 NOTE — PATIENT INSTRUCTIONS
Patient Education     Understanding Impetigo  Impetigo is a common bacterial infection of the skin. It most often affects the face, arms, and legs. But it can appear on any part of the body. Anyone can have it, regardless of age. But it's most common in children. Impetigo is very contagious. This means it spreads easily to other people.    How to say it  [id number]  What causes impetigo?   Many types of bacteria live on normal, healthy skin. The bacteria usually don t cause problems. Impetigo happens when bacteria enter the skin through a scratch, break, sore, bite, or irritated spot. They then begin to grow out of control, leading to infection. The two most common bacteria causing impetigo are Staphylococcus and Streptococcus. In certain cases, impetigo appears on skin that has no visible break. It may be more likely to occur on skin that has another skin problem, such as eczema. It may also be more common after a cold or other virus.   Symptoms of impetigo   Symptoms of this problem include:    Small, fluid-filled blisters on the skin that may itch, ooze, or crust    A yellow, honey-colored crust on the infected skin    Skin sores that spread with scratching    An itchy rash that spreads with scratching    Swollen lymph nodes  Treatment for impetigo   The goal is to treat the infection and prevent it from spreading to others.    You will likely be given an antibiotic to treat the infection. This may be a cream or ointment to put on your skin. You usually need to use the cream or ointment for about 5 days. If the infection is severe or spreading, you may be given antibiotic medicine to take by mouth. Be sure to use this medicine as directed. Don't stop using it until you are told to stop, even if your skin gets better. If you stop too soon, the infection may come back and be harder to treat.    Try not to scratch or pick at your sores. It may help to cover affected areas with a bandage.    To prevent spreading  the infection, wash your hands often. Avoid sharing personal items, towels, clothes, pillows, and sheets with others. After each use, wash these items in hot water.    Clean the affected skin several times a day. Don t scrub. Instead, soak the area in warm, soapy water. This will help remove the crust that forms. For places that you can't soak, such as the face, place a clean, warm (not hot) washcloth on the affected area. Use a new washcloth and towel each time.  When to call your healthcare provider   Call your healthcare provider right away if you have any of these:    Fever of 100.4 F (38 C) or higher, or as directed by your healthcare provider    Increasing number of sores or spreading areas of redness after 2 days of treatment with antibiotics    Increasing swelling or pain    Increased amounts of fluid or pus coming from the sores    Unusual drowsiness, weakness, or change in behavior    Loss of appetite or vomiting  [employer] last reviewed this educational content on 6/1/2019 2000-2021 [employer]. All rights reserved. This information is not intended as a substitute for professional medical care. Always follow your healthcare professional's instructions.

## 2021-10-14 NOTE — PROGRESS NOTES
Chief Complaint:    Chief Complaint   Patient presents with     Derm Problem     Started about 3wks ago, crusty and bumps in his nostril, spreading more to his face.          Medical Decision Making:    Vital signs reviewed by Adolfo Bowers PA-C  /81   Pulse 117   Temp 97.5  F (36.4  C) (Tympanic)   SpO2 100%     Differential Diagnosis:  Rash: Atopic dermatitis  Contact dermatitis  Eczema  Herpes zoster  Impetigo  Pityriasis rosea  Rash      ASSESSMENT:     1. Impetigo           PLAN:     Rx for Bactroban today.  Patient instructed to follow up with PCP in 1 week if symptoms are not improving.  Sooner if symptoms worsen.  Worrisome symptoms discussed with instructions to go to the ED.  Patient verbalized understanding and agreed with this plan.    Labs:     No results found for any visits on 10/14/21.    Current Meds:    Current Outpatient Medications:      mupirocin (BACTROBAN) 2 % external ointment, Apply topically 3 times daily, Disp: 30 g, Rfl: 0    Allergies:  No Known Allergies    SUBJECTIVE    HPI: Aleksandr Avina is an 40 year old male who presents for evaluation and treatment of rash and irritation to nose.  Reports he was seen in August for a rash to his face which was treated with antibiotics and resolved. Reports for the last 3 weeks, has had pain to right side of nose. Also has dry itchy patches just above right upper lip and to left chin underneath beard. Feels the pain and discomfort has been moving over to the left nostril as well. Has been trying a lotion to the area without improvement. Has had a large amount of dried, crusty drainage from nose that he has been removing with a tweezer. Denies and fevers, cough, shortness of breath or rash to any other areas of his body.    ROS:      Review of Systems   Constitutional: Negative.  Negative for chills and fever.   HENT: Negative.  Negative for ear pain and sore throat.    Respiratory: Negative.  Negative for cough, chest tightness, shortness  of breath and wheezing.    Cardiovascular: Negative.  Negative for chest pain and palpitations.   Gastrointestinal: Negative.  Negative for abdominal pain, diarrhea, nausea and vomiting.   Musculoskeletal: Negative.  Negative for myalgias.   Skin: Positive for rash.   Allergic/Immunologic: Negative.    Neurological: Negative.  Negative for headaches.        Family History   Family History   Problem Relation Age of Onset     Hypertension Father      Cerebrovascular Disease Father      Diabetes Maternal Grandfather      Thyroid Disease Sister      Heart Disease Paternal Grandmother      Cancer - colorectal Paternal Grandfather      Asthma No family hx of      C.A.D. No family hx of      Breast Cancer No family hx of      Prostate Cancer No family hx of      Lipids No family hx of        Social History  Social History     Socioeconomic History     Marital status:      Spouse name: Not on file     Number of children: Not on file     Years of education: Not on file     Highest education level: Not on file   Occupational History     Not on file   Tobacco Use     Smoking status: Current Some Day Smoker     Packs/day: 0.50     Smokeless tobacco: Never Used   Vaping Use     Vaping Use: Never used   Substance and Sexual Activity     Alcohol use: Yes     Comment: 6 beers per week     Drug use: No     Sexual activity: Yes     Partners: Female   Other Topics Concern     Parent/sibling w/ CABG, MI or angioplasty before 65F 55M? No   Social History Narrative     Not on file     Social Determinants of Health     Financial Resource Strain:      Difficulty of Paying Living Expenses:    Food Insecurity:      Worried About Running Out of Food in the Last Year:      Ran Out of Food in the Last Year:    Transportation Needs:      Lack of Transportation (Medical):      Lack of Transportation (Non-Medical):    Physical Activity:      Days of Exercise per Week:      Minutes of Exercise per Session:    Stress:      Feeling of Stress :     Social Connections:      Frequency of Communication with Friends and Family:      Frequency of Social Gatherings with Friends and Family:      Attends Mandae[employer] Services:      Active Member of Clubs or Organizations:      Attends Club or Organization Meetings:      Marital Status:    Intimate Partner Violence:      Fear of Current or Ex-Partner:      Emotionally Abused:      Physically Abused:      Sexually Abused:         Surgical History:  No past surgical history on file.     Problem List:  Patient Active Problem List   Diagnosis     CARDIOVASCULAR SCREENING; LDL GOAL LESS THAN 160     Tobacco abuse     Alcohol abuse     Weight loss     Thrombocytopenia (H)           OBJECTIVE:     Vital signs noted and reviewed by Adolfo Bowers PA-C  /81   Pulse 117   Temp 97.5  F (36.4  C) (Tympanic)   SpO2 100%      PEFR:    Physical Exam  Vitals and nursing note reviewed.   Constitutional:       General: He is not in acute distress.     Appearance: He is well-developed. He is not ill-appearing, toxic-appearing or diaphoretic.   HENT:      Head: Normocephalic and atraumatic.      Right Ear: Hearing, tympanic membrane, ear canal and external ear normal. Tympanic membrane is not perforated, erythematous, retracted or bulging.      Left Ear: Hearing, tympanic membrane, ear canal and external ear normal. Tympanic membrane is not perforated, erythematous, retracted or bulging.      Nose: Nasal tenderness present.      Comments: erythematous peeling skin to right nare. Honey crusted drainage to right nare and inside nostril.      Mouth/Throat:      Mouth: Mucous membranes are moist.      Pharynx: No oropharyngeal exudate or posterior oropharyngeal erythema.      Tonsils: No tonsillar exudate or tonsillar abscesses.   Eyes:      Pupils: Pupils are equal, round, and reactive to light.   Cardiovascular:      Rate and Rhythm: Normal rate and regular rhythm.      Heart sounds: Normal heart sounds, S1 normal and S2 normal.  Heart sounds not distant. No murmur heard.   No friction rub. No gallop.    Pulmonary:      Effort: Pulmonary effort is normal. No respiratory distress.      Breath sounds: Normal breath sounds. No decreased breath sounds, wheezing, rhonchi or rales.   Skin:     General: Skin is warm and dry.   Neurological:      Mental Status: He is alert.      Cranial Nerves: No cranial nerve deficit.   Psychiatric:         Attention and Perception: He is attentive.         Speech: Speech normal.         Behavior: Behavior normal. Behavior is cooperative.         Thought Content: Thought content normal.         Judgment: Judgment normal.             Adolfo Bowers PA-C  10/14/2021, 9:59 AM

## 2021-11-10 ENCOUNTER — PRE VISIT (OUTPATIENT)
Dept: ONCOLOGY | Facility: CLINIC | Age: 41
End: 2021-11-10

## 2021-11-30 NOTE — PROGRESS NOTES
RECORDS STATUS - ALL OTHER DIAGNOSIS      RECORDS RECEIVED FROM: [facility]   DATE RECEIVED: 1/19/2022   NOTES STATUS DETAILS   OFFICE NOTE from referring provider [facility]   Madan Diaz MD   OFFICE NOTE from medical oncologist Complete  11/10/2021 Virtual Visit- Thrombocytopenia     DISCHARGE SUMMARY from hospital N/A    DISCHARGE REPORT from the ER     OPERATIVE REPORT N/A    MEDICATION LIST Complete [facility]   CLINICAL TRIAL TREATMENTS TO DATE     LABS     PATHOLOGY REPORTS N/A    ANYTHING RELATED TO DIAGNOSIS Complete Labs last updated on 8/4/2021   GENONOMIC TESTING     TYPE:     IMAGING (NEED IMAGES & REPORT)     CT SCANS     MRI     MAMMO     ULTRASOUND     PET

## 2022-01-19 ENCOUNTER — PRE VISIT (OUTPATIENT)
Dept: ONCOLOGY | Facility: CLINIC | Age: 42
End: 2022-01-19

## 2022-09-19 ENCOUNTER — OFFICE VISIT (OUTPATIENT)
Dept: URGENT CARE | Facility: URGENT CARE | Age: 42
End: 2022-09-19
Payer: COMMERCIAL

## 2022-09-19 VITALS
TEMPERATURE: 97 F | SYSTOLIC BLOOD PRESSURE: 133 MMHG | BODY MASS INDEX: 19.01 KG/M2 | OXYGEN SATURATION: 97 % | HEART RATE: 111 BPM | WEIGHT: 130.6 LBS | DIASTOLIC BLOOD PRESSURE: 93 MMHG

## 2022-09-19 DIAGNOSIS — R20.2 TINGLING OF BOTH FEET: Primary | ICD-10-CM

## 2022-09-19 DIAGNOSIS — L98.9 SKIN LESION: ICD-10-CM

## 2022-09-19 LAB
FOLATE SERPL-MCNC: 11.5 NG/ML (ref 4.6–34.8)
HBA1C MFR BLD: 4.7 % (ref 0–5.6)

## 2022-09-19 PROCEDURE — 82746 ASSAY OF FOLIC ACID SERUM: CPT | Performed by: FAMILY MEDICINE

## 2022-09-19 PROCEDURE — 99214 OFFICE O/P EST MOD 30 MIN: CPT | Performed by: FAMILY MEDICINE

## 2022-09-19 PROCEDURE — 80053 COMPREHEN METABOLIC PANEL: CPT | Performed by: FAMILY MEDICINE

## 2022-09-19 PROCEDURE — 83036 HEMOGLOBIN GLYCOSYLATED A1C: CPT | Performed by: FAMILY MEDICINE

## 2022-09-19 PROCEDURE — 36415 COLL VENOUS BLD VENIPUNCTURE: CPT | Performed by: FAMILY MEDICINE

## 2022-09-19 PROCEDURE — 82607 VITAMIN B-12: CPT | Performed by: FAMILY MEDICINE

## 2022-09-19 NOTE — PATIENT INSTRUCTIONS
We will call you if your blood work returns abnormal      If symptoms get worse at any point return right away      Start using terbinafine cream or athlete's foot type of generic spray inbetween your toes and feet use as instructed. Typically twice a day for 10 days        For your skin lesions set up appointment with Dermatology (check thru your insurance on their website)

## 2022-09-19 NOTE — PROGRESS NOTES
Assessment & Plan     Tingling of both feet  Screening labs for diabetes and vitamin deficiencies. There is some evidence based on hygiene/appearance foot fungus may be present so recommended lamisil. No evidence of weakness on observing gait and without leg weakness on exam. F/u with PCP if continuing.   - Hemoglobin A1c  - Vitamin B12  - Folate  - Comprehensive metabolic panel (BMP + Alb, Alk Phos, ALT, AST, Total. Bili, TP)  - Comprehensive metabolic panel (BMP + Alb, Alk Phos, ALT, AST, Total. Bili, TP)  - Folate  - Vitamin B12  - Hemoglobin A1c    Skin lesion  Have quality of a soft mobile lipoma -- recommend derm evaluation given they have been present for 2 years and are slowly expanding.        Bang Parr MD   [facility]    Subjective     Aleksandr is a 41 year old male who presents to clinic today for the following health issues:  Chief Complaint   Patient presents with     Musculoskeletal Problem     Feet pain- per ppt he has bilateral pain in the top, toes and arch.  Pins needles and shooting pain, makes his leg jerk     Derm Problem     Has 2 cysts , he would like looked at - rt temple and left behind ear.     HPI    1) reports tip of toes are painful as of last 4-5 months  He is not a diabetic to his knowledge.   NO leg weakness.   No episodes of falls without reason primarily just stumbles when drinking alcohol ( he says jokingly).   No episodes of seizures    2)   A few years ago at [place] he had a doctor cut out a lesion behind his R ear -- this was not cancerous. He does not recall what the diagnosis was.     A)2 years ago has had a soft mass form lateral right eye  B) left inferior/posterior ear area another soft mass present for 2 years  Neither mass have drained fluid or are painful.    Patient Active Problem List    Diagnosis Date Noted     Thrombocytopenia (H) 08/05/2021     Priority: Medium     Alcohol abuse 01/23/2014     Priority: Medium     Weight loss 01/23/2014      Priority: Medium     Tobacco abuse 11/07/2013     Priority: Medium     CARDIOVASCULAR SCREENING; LDL GOAL LESS THAN 160 09/23/2013     Priority: Medium       Current Outpatient Medications   Medication     mupirocin (BACTROBAN) 2 % external ointment     No current facility-administered medications for this visit.           Objective    BP (!) 133/93   Pulse 111   Temp 97  F (36.1  C) (Tympanic)   Wt 59.2 kg (130 lb 9.6 oz)   SpO2 97%   BMI 19.01 kg/m    Physical Exam     Feet: mild onchomyosis, moves all digits. No open wounds. Between cracks of toe some dried yellowish skin. No discharge. Cap refill < 2 seconds peripherally. 2+ DP bilaterally  Gait: normal  MSK: 5/5 leg extension and hip flexion  Gait: stable  Face: R lateral face next to orbit a soft flesh colored mass felt underneath the skin about 1 x 0.5cm  Left ear: soft mass < 1 cm in diameter                Results for orders placed or performed in visit on 09/19/22   Hemoglobin A1c     Status: Normal   Result Value Ref Range    Hemoglobin A1C 4.7 0.0 - 5.6 %                     The use of [employer] dictation services may have been used to construct the content in this note; any grammatical or spelling errors are non-intentional. Please contact the author of this note directly if you are in need of any clarification.

## 2022-09-20 LAB
ALBUMIN SERPL-MCNC: 3.6 G/DL (ref 3.4–5)
ALP SERPL-CCNC: 81 U/L (ref 40–150)
ALT SERPL W P-5'-P-CCNC: 47 U/L (ref 0–70)
ANION GAP SERPL CALCULATED.3IONS-SCNC: 11 MMOL/L (ref 3–14)
AST SERPL W P-5'-P-CCNC: 82 U/L (ref 0–45)
BILIRUB SERPL-MCNC: 0.5 MG/DL (ref 0.2–1.3)
BUN SERPL-MCNC: 3 MG/DL (ref 7–30)
CALCIUM SERPL-MCNC: 8.1 MG/DL (ref 8.5–10.1)
CHLORIDE BLD-SCNC: 91 MMOL/L (ref 94–109)
CO2 SERPL-SCNC: 24 MMOL/L (ref 20–32)
CREAT SERPL-MCNC: 0.48 MG/DL (ref 0.66–1.25)
GFR SERPL CREATININE-BSD FRML MDRD: >90 ML/MIN/1.73M2
GLUCOSE BLD-MCNC: 112 MG/DL (ref 70–99)
POTASSIUM BLD-SCNC: 4 MMOL/L (ref 3.4–5.3)
PROT SERPL-MCNC: 7.2 G/DL (ref 6.8–8.8)
SODIUM SERPL-SCNC: 126 MMOL/L (ref 133–144)
VIT B12 SERPL-MCNC: 545 PG/ML (ref 232–1245)

## 2023-12-02 ENCOUNTER — LAB REQUISITION (OUTPATIENT)
Dept: LAB | Facility: CLINIC | Age: 43
End: 2023-12-02
Payer: COMMERCIAL

## 2023-12-02 DIAGNOSIS — E87.1 HYPO-OSMOLALITY AND HYPONATREMIA: ICD-10-CM

## 2023-12-02 DIAGNOSIS — D69.6 THROMBOCYTOPENIA, UNSPECIFIED (H): ICD-10-CM

## 2023-12-02 DIAGNOSIS — E43 UNSPECIFIED SEVERE PROTEIN-CALORIE MALNUTRITION (H): ICD-10-CM

## 2023-12-04 LAB
ANION GAP SERPL CALCULATED.3IONS-SCNC: 10 MMOL/L (ref 7–15)
BUN SERPL-MCNC: 6.3 MG/DL (ref 6–20)
CALCIUM SERPL-MCNC: 8.6 MG/DL (ref 8.6–10)
CHLORIDE SERPL-SCNC: 89 MMOL/L (ref 98–107)
CREAT SERPL-MCNC: 0.46 MG/DL (ref 0.67–1.17)
DEPRECATED HCO3 PLAS-SCNC: 24 MMOL/L (ref 22–29)
EGFRCR SERPLBLD CKD-EPI 2021: >90 ML/MIN/1.73M2
GLUCOSE SERPL-MCNC: 68 MG/DL (ref 70–99)
MAGNESIUM SERPL-MCNC: 1.6 MG/DL (ref 1.7–2.3)
PHOSPHATE SERPL-MCNC: 5.1 MG/DL (ref 2.5–4.5)
POTASSIUM SERPL-SCNC: 4.3 MMOL/L (ref 3.4–5.3)
SODIUM SERPL-SCNC: 123 MMOL/L (ref 135–145)

## 2023-12-04 PROCEDURE — 84100 ASSAY OF PHOSPHORUS: CPT | Mod: ORL | Performed by: INTERNAL MEDICINE

## 2023-12-04 PROCEDURE — 36415 COLL VENOUS BLD VENIPUNCTURE: CPT | Mod: ORL | Performed by: INTERNAL MEDICINE

## 2023-12-04 PROCEDURE — P9604 ONE-WAY ALLOW PRORATED TRIP: HCPCS | Mod: ORL | Performed by: INTERNAL MEDICINE

## 2023-12-04 PROCEDURE — 83735 ASSAY OF MAGNESIUM: CPT | Mod: ORL | Performed by: INTERNAL MEDICINE

## 2023-12-04 PROCEDURE — 80048 BASIC METABOLIC PNL TOTAL CA: CPT | Mod: ORL | Performed by: INTERNAL MEDICINE

## 2023-12-16 ENCOUNTER — LAB REQUISITION (OUTPATIENT)
Dept: LAB | Facility: CLINIC | Age: 43
End: 2023-12-16
Payer: COMMERCIAL

## 2023-12-16 DIAGNOSIS — E43 UNSPECIFIED SEVERE PROTEIN-CALORIE MALNUTRITION (H): ICD-10-CM

## 2023-12-16 DIAGNOSIS — D69.6 THROMBOCYTOPENIA, UNSPECIFIED (H): ICD-10-CM

## 2023-12-16 DIAGNOSIS — E87.1 HYPO-OSMOLALITY AND HYPONATREMIA: ICD-10-CM

## 2023-12-16 DIAGNOSIS — F10.20 ALCOHOL DEPENDENCE, UNCOMPLICATED (H): ICD-10-CM

## 2023-12-16 DIAGNOSIS — F10.230 ALCOHOL DEPENDENCE WITH WITHDRAWAL, UNCOMPLICATED (H): ICD-10-CM

## 2023-12-18 LAB
ALBUMIN SERPL BCG-MCNC: 3.7 G/DL (ref 3.5–5.2)
ALBUMIN SERPL BCG-MCNC: 3.7 G/DL (ref 3.5–5.2)
ALP SERPL-CCNC: 62 U/L (ref 40–150)
ALP SERPL-CCNC: 62 U/L (ref 40–150)
ALT SERPL W P-5'-P-CCNC: 7 U/L (ref 0–70)
ALT SERPL W P-5'-P-CCNC: 7 U/L (ref 0–70)
ANION GAP SERPL CALCULATED.3IONS-SCNC: 10 MMOL/L (ref 7–15)
AST SERPL W P-5'-P-CCNC: 20 U/L (ref 0–45)
AST SERPL W P-5'-P-CCNC: 20 U/L (ref 0–45)
BILIRUB DIRECT SERPL-MCNC: <0.2 MG/DL (ref 0–0.3)
BILIRUB SERPL-MCNC: 0.2 MG/DL
BILIRUB SERPL-MCNC: 0.2 MG/DL
BUN SERPL-MCNC: 18.3 MG/DL (ref 6–20)
CALCIUM SERPL-MCNC: 9.7 MG/DL (ref 8.6–10)
CHLORIDE SERPL-SCNC: 98 MMOL/L (ref 98–107)
CREAT SERPL-MCNC: 0.55 MG/DL (ref 0.67–1.17)
DEPRECATED HCO3 PLAS-SCNC: 25 MMOL/L (ref 22–29)
EGFRCR SERPLBLD CKD-EPI 2021: >90 ML/MIN/1.73M2
GLUCOSE SERPL-MCNC: 82 MG/DL (ref 70–99)
MAGNESIUM SERPL-MCNC: 1.7 MG/DL (ref 1.7–2.3)
POTASSIUM SERPL-SCNC: 4.5 MMOL/L (ref 3.4–5.3)
PROT SERPL-MCNC: 6.6 G/DL (ref 6.4–8.3)
PROT SERPL-MCNC: 6.6 G/DL (ref 6.4–8.3)
SODIUM SERPL-SCNC: 133 MMOL/L (ref 135–145)
VIT D+METAB SERPL-MCNC: 18 NG/ML (ref 20–50)

## 2023-12-18 PROCEDURE — 83735 ASSAY OF MAGNESIUM: CPT | Mod: ORL | Performed by: INTERNAL MEDICINE

## 2023-12-18 PROCEDURE — P9603 ONE-WAY ALLOW PRORATED MILES: HCPCS | Mod: ORL | Performed by: INTERNAL MEDICINE

## 2023-12-18 PROCEDURE — 82306 VITAMIN D 25 HYDROXY: CPT | Mod: ORL | Performed by: INTERNAL MEDICINE

## 2023-12-18 PROCEDURE — 80053 COMPREHEN METABOLIC PANEL: CPT | Mod: ORL | Performed by: INTERNAL MEDICINE

## 2023-12-18 PROCEDURE — 36415 COLL VENOUS BLD VENIPUNCTURE: CPT | Mod: ORL | Performed by: INTERNAL MEDICINE

## 2024-02-29 ENCOUNTER — LAB REQUISITION (OUTPATIENT)
Dept: LAB | Facility: CLINIC | Age: 44
End: 2024-02-29
Payer: COMMERCIAL

## 2024-02-29 DIAGNOSIS — R25.2 CRAMP AND SPASM: ICD-10-CM

## 2024-02-29 DIAGNOSIS — E83.42 HYPOMAGNESEMIA: ICD-10-CM

## 2024-02-29 DIAGNOSIS — F43.23 ADJUSTMENT DISORDER WITH MIXED ANXIETY AND DEPRESSED MOOD: ICD-10-CM

## 2024-02-29 DIAGNOSIS — G62.1 ALCOHOLIC POLYNEUROPATHY (H): ICD-10-CM

## 2024-02-29 DIAGNOSIS — E55.9 VITAMIN D DEFICIENCY, UNSPECIFIED: ICD-10-CM

## 2024-02-29 DIAGNOSIS — E87.1 HYPO-OSMOLALITY AND HYPONATREMIA: ICD-10-CM

## 2024-02-29 LAB
ALBUMIN UR-MCNC: 300 MG/DL
APPEARANCE UR: ABNORMAL
BACTERIA #/AREA URNS HPF: ABNORMAL /HPF
BILIRUB UR QL STRIP: NEGATIVE
COLOR UR AUTO: ABNORMAL
GLUCOSE UR STRIP-MCNC: NEGATIVE MG/DL
HGB UR QL STRIP: ABNORMAL
KETONES UR STRIP-MCNC: NEGATIVE MG/DL
LEUKOCYTE ESTERASE UR QL STRIP: ABNORMAL
NITRATE UR QL: POSITIVE
PH UR STRIP: 8.5 [PH] (ref 5–7)
RBC URINE: >182 /HPF
SP GR UR STRIP: 1.02 (ref 1–1.03)
TRANSITIONAL EPI: 1 /HPF
UROBILINOGEN UR STRIP-MCNC: NORMAL MG/DL
WBC URINE: >182 /HPF

## 2024-02-29 PROCEDURE — 81001 URINALYSIS AUTO W/SCOPE: CPT | Mod: ORL | Performed by: NURSE PRACTITIONER

## 2024-02-29 PROCEDURE — 87086 URINE CULTURE/COLONY COUNT: CPT | Mod: ORL | Performed by: NURSE PRACTITIONER

## 2024-03-02 LAB — BACTERIA UR CULT: ABNORMAL

## 2024-04-12 ENCOUNTER — TRANSFERRED RECORDS (OUTPATIENT)
Dept: HEALTH INFORMATION MANAGEMENT | Facility: CLINIC | Age: 44
End: 2024-04-12
Payer: COMMERCIAL

## 2024-05-30 ENCOUNTER — TRANSFERRED RECORDS (OUTPATIENT)
Dept: HEALTH INFORMATION MANAGEMENT | Facility: CLINIC | Age: 44
End: 2024-05-30
Payer: COMMERCIAL

## 2024-07-08 ENCOUNTER — TRANSFERRED RECORDS (OUTPATIENT)
Dept: HEALTH INFORMATION MANAGEMENT | Facility: CLINIC | Age: 44
End: 2024-07-08
Payer: COMMERCIAL

## 2024-09-27 ENCOUNTER — TRANSFERRED RECORDS (OUTPATIENT)
Dept: HEALTH INFORMATION MANAGEMENT | Facility: CLINIC | Age: 44
End: 2024-09-27
Payer: COMMERCIAL

## 2024-10-18 ENCOUNTER — TRANSFERRED RECORDS (OUTPATIENT)
Dept: HEALTH INFORMATION MANAGEMENT | Facility: CLINIC | Age: 44
End: 2024-10-18
Payer: COMMERCIAL

## 2024-11-20 ENCOUNTER — TRANSFERRED RECORDS (OUTPATIENT)
Dept: HEALTH INFORMATION MANAGEMENT | Facility: CLINIC | Age: 44
End: 2024-11-20
Payer: COMMERCIAL

## 2025-05-29 ENCOUNTER — TRANSFERRED RECORDS (OUTPATIENT)
Dept: HEALTH INFORMATION MANAGEMENT | Facility: CLINIC | Age: 45
End: 2025-05-29
Payer: COMMERCIAL

## 2025-07-16 ENCOUNTER — TRANSFERRED RECORDS (OUTPATIENT)
Dept: HEALTH INFORMATION MANAGEMENT | Facility: CLINIC | Age: 45
End: 2025-07-16
Payer: COMMERCIAL